# Patient Record
Sex: MALE | Race: OTHER | NOT HISPANIC OR LATINO | Employment: OTHER | URBAN - METROPOLITAN AREA
[De-identification: names, ages, dates, MRNs, and addresses within clinical notes are randomized per-mention and may not be internally consistent; named-entity substitution may affect disease eponyms.]

---

## 2022-07-22 ENCOUNTER — HOSPITAL ENCOUNTER (OUTPATIENT)
Dept: CARDIOLOGY | Facility: HOSPITAL | Age: 72
Discharge: HOME | End: 2022-07-22
Attending: INTERNAL MEDICINE
Payer: MEDICARE

## 2022-07-22 ENCOUNTER — OFFICE VISIT (OUTPATIENT)
Dept: CARDIOLOGY | Facility: CLINIC | Age: 72
End: 2022-07-22
Payer: MEDICARE

## 2022-07-22 VITALS
BODY MASS INDEX: 28.27 KG/M2 | DIASTOLIC BLOOD PRESSURE: 74 MMHG | HEART RATE: 64 BPM | SYSTOLIC BLOOD PRESSURE: 133 MMHG | WEIGHT: 220.25 LBS | HEIGHT: 74 IN

## 2022-07-22 DIAGNOSIS — I10 PRIMARY HYPERTENSION: ICD-10-CM

## 2022-07-22 DIAGNOSIS — I48.91 ATRIAL FIBRILLATION, UNSPECIFIED TYPE: ICD-10-CM

## 2022-07-22 DIAGNOSIS — E11.9 TYPE 2 DIABETES MELLITUS WITHOUT COMPLICATION, WITH LONG-TERM CURRENT USE OF INSULIN: ICD-10-CM

## 2022-07-22 DIAGNOSIS — E11.9 TYPE 2 DIABETES MELLITUS WITHOUT COMPLICATION, WITHOUT LONG-TERM CURRENT USE OF INSULIN: ICD-10-CM

## 2022-07-22 DIAGNOSIS — I48.92 PAROXYSMAL ATRIAL FLUTTER: ICD-10-CM

## 2022-07-22 DIAGNOSIS — Z79.4 TYPE 2 DIABETES MELLITUS WITHOUT COMPLICATION, WITH LONG-TERM CURRENT USE OF INSULIN: ICD-10-CM

## 2022-07-22 DIAGNOSIS — E78.49 OTHER HYPERLIPIDEMIA: ICD-10-CM

## 2022-07-22 DIAGNOSIS — I48.91 ATRIAL FIBRILLATION, UNSPECIFIED TYPE: Primary | ICD-10-CM

## 2022-07-22 DIAGNOSIS — I34.0 NONRHEUMATIC MITRAL VALVE REGURGITATION: Primary | ICD-10-CM

## 2022-07-22 DIAGNOSIS — I50.32 CHRONIC DIASTOLIC CONGESTIVE HEART FAILURE: ICD-10-CM

## 2022-07-22 PROCEDURE — 1126F AMNT PAIN NOTED NONE PRSNT: CPT | Mod: CPTII,S$GLB,, | Performed by: INTERNAL MEDICINE

## 2022-07-22 PROCEDURE — 1159F PR MEDICATION LIST DOCUMENTED IN MEDICAL RECORD: ICD-10-PCS | Mod: CPTII,S$GLB,, | Performed by: INTERNAL MEDICINE

## 2022-07-22 PROCEDURE — 4010F ACE/ARB THERAPY RXD/TAKEN: CPT | Mod: CPTII,S$GLB,, | Performed by: INTERNAL MEDICINE

## 2022-07-22 PROCEDURE — 99205 OFFICE O/P NEW HI 60 MIN: CPT | Mod: S$GLB,,, | Performed by: INTERNAL MEDICINE

## 2022-07-22 PROCEDURE — 99999 PR PBB SHADOW E&M-NEW PATIENT-LVL III: CPT | Mod: PBBFAC,,, | Performed by: INTERNAL MEDICINE

## 2022-07-22 PROCEDURE — 93005 ELECTROCARDIOGRAM TRACING: CPT

## 2022-07-22 PROCEDURE — 1160F RVW MEDS BY RX/DR IN RCRD: CPT | Mod: CPTII,S$GLB,, | Performed by: INTERNAL MEDICINE

## 2022-07-22 PROCEDURE — 3008F PR BODY MASS INDEX (BMI) DOCUMENTED: ICD-10-PCS | Mod: CPTII,S$GLB,, | Performed by: INTERNAL MEDICINE

## 2022-07-22 PROCEDURE — 1159F MED LIST DOCD IN RCRD: CPT | Mod: CPTII,S$GLB,, | Performed by: INTERNAL MEDICINE

## 2022-07-22 PROCEDURE — 3008F BODY MASS INDEX DOCD: CPT | Mod: CPTII,S$GLB,, | Performed by: INTERNAL MEDICINE

## 2022-07-22 PROCEDURE — 3078F PR MOST RECENT DIASTOLIC BLOOD PRESSURE < 80 MM HG: ICD-10-PCS | Mod: CPTII,S$GLB,, | Performed by: INTERNAL MEDICINE

## 2022-07-22 PROCEDURE — 93010 EKG 12-LEAD: ICD-10-PCS | Mod: ,,, | Performed by: INTERNAL MEDICINE

## 2022-07-22 PROCEDURE — 3078F DIAST BP <80 MM HG: CPT | Mod: CPTII,S$GLB,, | Performed by: INTERNAL MEDICINE

## 2022-07-22 PROCEDURE — 1160F PR REVIEW ALL MEDS BY PRESCRIBER/CLIN PHARMACIST DOCUMENTED: ICD-10-PCS | Mod: CPTII,S$GLB,, | Performed by: INTERNAL MEDICINE

## 2022-07-22 PROCEDURE — 93010 ELECTROCARDIOGRAM REPORT: CPT | Mod: ,,, | Performed by: INTERNAL MEDICINE

## 2022-07-22 PROCEDURE — 99999 PR PBB SHADOW E&M-NEW PATIENT-LVL III: ICD-10-PCS | Mod: PBBFAC,,, | Performed by: INTERNAL MEDICINE

## 2022-07-22 PROCEDURE — 4010F PR ACE/ARB THEARPY RXD/TAKEN: ICD-10-PCS | Mod: CPTII,S$GLB,, | Performed by: INTERNAL MEDICINE

## 2022-07-22 PROCEDURE — 1126F PR PAIN SEVERITY QUANTIFIED, NO PAIN PRESENT: ICD-10-PCS | Mod: CPTII,S$GLB,, | Performed by: INTERNAL MEDICINE

## 2022-07-22 PROCEDURE — 3075F PR MOST RECENT SYSTOLIC BLOOD PRESS GE 130-139MM HG: ICD-10-PCS | Mod: CPTII,S$GLB,, | Performed by: INTERNAL MEDICINE

## 2022-07-22 PROCEDURE — 3075F SYST BP GE 130 - 139MM HG: CPT | Mod: CPTII,S$GLB,, | Performed by: INTERNAL MEDICINE

## 2022-07-22 PROCEDURE — 99205 PR OFFICE/OUTPT VISIT, NEW, LEVL V, 60-74 MIN: ICD-10-PCS | Mod: S$GLB,,, | Performed by: INTERNAL MEDICINE

## 2022-07-22 RX ORDER — INSULIN GLARGINE 100 [IU]/ML
20 INJECTION, SOLUTION SUBCUTANEOUS
COMMUNITY
Start: 2021-11-30

## 2022-07-22 RX ORDER — BUPROPION HYDROCHLORIDE 150 MG/1
150 TABLET ORAL NIGHTLY
COMMUNITY
Start: 2022-01-02

## 2022-07-22 RX ORDER — METHOCARBAMOL 500 MG/1
1 TABLET, FILM COATED ORAL DAILY PRN
COMMUNITY
Start: 2022-07-03

## 2022-07-22 RX ORDER — DOXAZOSIN 8 MG/1
8 TABLET ORAL DAILY
COMMUNITY
Start: 2022-07-05

## 2022-07-22 RX ORDER — EMPAGLIFLOZIN 25 MG/1
25 TABLET, FILM COATED ORAL DAILY
COMMUNITY
Start: 2022-07-08

## 2022-07-22 RX ORDER — IRBESARTAN 300 MG/1
1 TABLET ORAL NIGHTLY
COMMUNITY
Start: 2022-05-30

## 2022-07-22 RX ORDER — APIXABAN 5 MG/1
5 TABLET, FILM COATED ORAL 2 TIMES DAILY
COMMUNITY
Start: 2022-07-15

## 2022-07-22 RX ORDER — DILTIAZEM HYDROCHLORIDE 240 MG/1
240 CAPSULE, COATED, EXTENDED RELEASE ORAL DAILY
COMMUNITY
Start: 2022-06-22 | End: 2022-08-23

## 2022-07-22 RX ORDER — PIOGLITAZONEHYDROCHLORIDE 15 MG/1
15 TABLET ORAL DAILY
COMMUNITY

## 2022-07-22 RX ORDER — AMLODIPINE BESYLATE 5 MG/1
5 TABLET ORAL DAILY
COMMUNITY
Start: 2022-06-23

## 2022-07-22 RX ORDER — MELOXICAM 7.5 MG/1
1 TABLET ORAL DAILY
COMMUNITY
Start: 2021-11-30

## 2022-07-22 RX ORDER — SPIRONOLACTONE 25 MG/1
25 TABLET ORAL DAILY
COMMUNITY
Start: 2022-02-18 | End: 2022-11-02

## 2022-07-22 RX ORDER — GLIPIZIDE 10 MG/1
1 TABLET ORAL 2 TIMES DAILY
COMMUNITY
Start: 2022-07-05

## 2022-07-22 RX ORDER — VITAMIN B COMPLEX
1 CAPSULE ORAL DAILY
COMMUNITY

## 2022-07-22 RX ORDER — METFORMIN HYDROCHLORIDE 1000 MG/1
1000 TABLET ORAL 2 TIMES DAILY
COMMUNITY
Start: 2022-06-22

## 2022-07-22 RX ORDER — HYDROCHLOROTHIAZIDE 25 MG/1
25 TABLET ORAL DAILY
COMMUNITY
Start: 2022-06-22

## 2022-07-22 RX ORDER — ASPIRIN 81 MG/1
81 TABLET ORAL
COMMUNITY

## 2022-07-22 RX ORDER — ATORVASTATIN CALCIUM 10 MG/1
10 TABLET, FILM COATED ORAL DAILY
COMMUNITY
Start: 2022-06-22

## 2022-07-22 NOTE — PROGRESS NOTES
Subjective:   Patient ID:  Chelle Villar is a 71 y.o. male who presents for evaluation of Valvular Heart Disease and Congestive Heart Failure      HPI   From DR ANDERSON   70-year-old  with a history of hypertension, heart failure with preserved ejection fraction/mid range heart failure, moderate mitral insufficiency diabetes mellitus, hyperlipidemia, paroxysmal atrial flutter presents for cardiac evaluation     The patient underwent stress echo EF was 45%. Echo and stress moderate to severe MR    Patient states he is asymptomatic he specific denies any chest pains or shortness of breath    Given his LV dysfunction I would like to consider moving forward with mitral valve surgery patient wishes a repeat of a 6 months and we will rediscuss then a cardiac echo be done prior to his follow-up    7/22/2022  HERE FRO SECOND OPINION  He was told he has a drop in ef and moderate mr. He walks for 1 mile he gets fatigued he plays some soccer low impact . He has no change in activity over the past 6 months. yhe was seen in march he had a lot of alcohol opn board. He drinks 6-7 beers twice weekly when he goes to [employer].   \lasta 1cf in 11/2021 5.9%   He is not sure why eh is labeled as allergic to ace inhibitors.has no nocturnal symptoms has no recent leg swelling he is using himalaya salt. He is not compliant with salt. He does not snore routinely. Has no orthop[leandro no pnd he is compliant with noac. He has lbbb on ekg has negative stress echo    there is a mention that his mr got worse with exercise   Was told he needs to entertain surgery. He is not on renal protection with arb.     Past Medical History:   Diagnosis Date    Chronic diastolic congestive heart failure 7/22/2022    Nonrheumatic mitral valve regurgitation 7/22/2022    Other hyperlipidemia 7/22/2022    Primary hypertension 7/22/2022    Type 2 diabetes mellitus without complication, without long-term current use of insulin 7/22/2022    Type 2  diabetes mellitus, with long-term current use of insulin 7/22/2022       Past Surgical History:   Procedure Laterality Date    INGUINAL HERNIA REPAIR         Social History     Tobacco Use    Smoking status: Former Smoker    Smokeless tobacco: Never Used   Substance Use Topics    Alcohol use: Yes     Alcohol/week: 7.0 standard drinks     Types: 7 Cans of beer per week     Comment: at least twice weekly        Family History   Problem Relation Age of Onset    Hypertension Mother        Current Outpatient Medications   Medication Sig    buPROPion (WELLBUTRIN XL) 150 MG TB24 tablet Take 150 mg by mouth every evening.    glipiZIDE (GLUCOTROL) 10 MG tablet Take 1 tablet by mouth 2 (two) times daily.    insulin (LANTUS SOLOSTAR U-100 INSULIN) glargine 100 units/mL SubQ pen Inject 20 Units into the skin.    irbesartan (AVAPRO) 300 MG tablet Take 1 tablet by mouth every evening.    meloxicam (MOBIC) 7.5 MG tablet Take 1 tablet by mouth once daily.    spironolactone (ALDACTONE) 25 MG tablet Take 25 mg by mouth once daily.    amLODIPine (NORVASC) 5 MG tablet Take 5 mg by mouth once daily.    aspirin (ECOTRIN) 81 MG EC tablet Take 81 mg by mouth.    atorvastatin (LIPITOR) 10 MG tablet Take 10 mg by mouth once daily.    b complex vitamins capsule Take 1 capsule by mouth once daily.    diltiaZEM (CARDIZEM CD) 240 MG 24 hr capsule Take 240 mg by mouth once daily.    doxazosin (CARDURA) 8 MG Tab Take 8 mg by mouth once daily.    ELIQUIS 5 mg Tab Take 5 mg by mouth 2 (two) times daily.    hydroCHLOROthiazide (HYDRODIURIL) 25 MG tablet Take 25 mg by mouth once daily.    JARDIANCE 25 mg tablet Take 25 mg by mouth once daily.    metFORMIN (GLUCOPHAGE) 1000 MG tablet Take 1,000 mg by mouth 2 (two) times daily.    methocarbamoL (ROBAXIN) 500 MG Tab Take 1 tablet by mouth daily as needed.    pioglitazone (ACTOS) 15 MG tablet Take 15 mg by mouth once daily.     No current facility-administered medications for this  visit.     Current Outpatient Medications on File Prior to Visit   Medication Sig    buPROPion (WELLBUTRIN XL) 150 MG TB24 tablet Take 150 mg by mouth every evening.    glipiZIDE (GLUCOTROL) 10 MG tablet Take 1 tablet by mouth 2 (two) times daily.    insulin (LANTUS SOLOSTAR U-100 INSULIN) glargine 100 units/mL SubQ pen Inject 20 Units into the skin.    irbesartan (AVAPRO) 300 MG tablet Take 1 tablet by mouth every evening.    meloxicam (MOBIC) 7.5 MG tablet Take 1 tablet by mouth once daily.    spironolactone (ALDACTONE) 25 MG tablet Take 25 mg by mouth once daily.    amLODIPine (NORVASC) 5 MG tablet Take 5 mg by mouth once daily.    aspirin (ECOTRIN) 81 MG EC tablet Take 81 mg by mouth.    atorvastatin (LIPITOR) 10 MG tablet Take 10 mg by mouth once daily.    b complex vitamins capsule Take 1 capsule by mouth once daily.    diltiaZEM (CARDIZEM CD) 240 MG 24 hr capsule Take 240 mg by mouth once daily.    doxazosin (CARDURA) 8 MG Tab Take 8 mg by mouth once daily.    ELIQUIS 5 mg Tab Take 5 mg by mouth 2 (two) times daily.    hydroCHLOROthiazide (HYDRODIURIL) 25 MG tablet Take 25 mg by mouth once daily.    JARDIANCE 25 mg tablet Take 25 mg by mouth once daily.    metFORMIN (GLUCOPHAGE) 1000 MG tablet Take 1,000 mg by mouth 2 (two) times daily.    methocarbamoL (ROBAXIN) 500 MG Tab Take 1 tablet by mouth daily as needed.    pioglitazone (ACTOS) 15 MG tablet Take 15 mg by mouth once daily.     No current facility-administered medications on file prior to visit.       Review of patient's allergies indicates:   Allergen Reactions    Ace inhibitors        Review of Systems   Constitutional: Positive for malaise/fatigue.   Eyes: Negative for blurred vision.   Cardiovascular: Negative for chest pain, claudication, cyanosis, dyspnea on exertion, irregular heartbeat, leg swelling, near-syncope, orthopnea, palpitations and paroxysmal nocturnal dyspnea.   Respiratory: Negative for cough, hemoptysis and  shortness of breath.    Hematologic/Lymphatic: Negative for bleeding problem. Does not bruise/bleed easily.   Skin: Negative for dry skin and itching.   Musculoskeletal: Negative for falls, muscle weakness and myalgias.   Gastrointestinal: Negative for abdominal pain, diarrhea, heartburn, hematemesis, hematochezia and melena.   Genitourinary: Negative for flank pain and hematuria.   Neurological: Negative for dizziness, focal weakness, headaches, light-headedness, numbness, paresthesias, seizures and weakness.   Psychiatric/Behavioral: Negative for altered mental status and memory loss. The patient is not nervous/anxious.    Allergic/Immunologic: Negative for hives.       Objective:   Physical Exam  Vitals and nursing note reviewed.   Constitutional:       General: He is not in acute distress.     Appearance: He is well-developed. He is not diaphoretic.   HENT:      Head: Normocephalic and atraumatic.   Eyes:      General:         Right eye: No discharge.         Left eye: No discharge.      Pupils: Pupils are equal, round, and reactive to light.   Neck:      Thyroid: No thyromegaly.      Vascular: No JVD.   Cardiovascular:      Rate and Rhythm: Normal rate and regular rhythm.      Pulses: Normal pulses and intact distal pulses.           Carotid pulses are 2+ on the right side and 2+ on the left side.       Radial pulses are 2+ on the right side and 2+ on the left side.        Femoral pulses are 2+ on the right side and 2+ on the left side.       Popliteal pulses are 2+ on the right side and 2+ on the left side.        Dorsalis pedis pulses are 2+ on the right side and 2+ on the left side.        Posterior tibial pulses are 2+ on the right side and 2+ on the left side.      Heart sounds: Murmur heard.   High-pitched blowing holosystolic murmur is present with a grade of 2/6 at the apex.    No friction rub. No gallop.   Pulmonary:      Effort: Pulmonary effort is normal. No respiratory distress.      Breath sounds:  "Normal breath sounds. No wheezing or rales.   Chest:      Chest wall: No tenderness.   Abdominal:      General: Bowel sounds are normal. There is no distension.      Palpations: Abdomen is soft.      Tenderness: There is no abdominal tenderness.   Musculoskeletal:         General: Normal range of motion.      Cervical back: Neck supple.      Right lower leg: No edema.      Left lower leg: No edema.   Skin:     General: Skin is warm and dry.      Findings: No erythema or rash.   Neurological:      Mental Status: He is alert and oriented to person, place, and time.      Cranial Nerves: No cranial nerve deficit.   Psychiatric:         Behavior: Behavior normal.         Judgment: Judgment normal.       Vitals:    07/22/22 0835   BP: 133/74   BP Location: Right arm   Patient Position: Sitting   BP Method: Small (Automatic)   Pulse: 64   Weight: 99.9 kg (220 lb 3.8 oz)   Height: 6' 2" (1.88 m)     No results found for: CHOL  No results found for: HDL  No results found for: LDLCALC  No results found for: TRIG  No results found for: CHOLHDL    Chemistry    No results found for: NA, K, CL, CO2, BUN, CREATININE, GLU No results found for: CALCIUM, ALKPHOS, AST, ALT, BILITOT, ESTGFRAFRICA, EGFRNONAA       No results found for: TSH  No results found for: INR, PROTIME  No results found for: WBC, HGB, HCT, MCV, PLT  BNP  @LABRCNTIP(BNP,BNPTRIAGEBLO)@  CrCl cannot be calculated (No successful lab value found.).     TREADMILL STRESS ECHO REPORT     DATE OF STUDY   3/24/2022     PATIENT   Chelle Villar   [id number]     PROCEDURES PERFORMED   Treadmill stress echocardiogram     INDICATION   Chest pain     PROCEDURE DETAILS   The procedure details were explained to the patient and/or guardian.     Informed consent was obtained. The patient underwent standard [place]   cardiovascular stress testing on the motorized treadmill.   Echocardiographic images were obtained in multiple views at baseline and   after maximum heart rate was achieved. "     Resting HR: 105 bpm    Target HR: 125 bpm   Resting BP: 104/62 mmHg     Max HR: 163 bpm % Target: 148%   Max Systolic: 142 mmHg   Max Diastolic: 88 mmHg   Exercise Duration: 06:00 min:sec    METS: 7.2 METS     The test was terminated due to the patient requesting to stop; ecg   changes; .     The patient reported no chest pain; .     FINDINGS   Baseline ECG: Normal sinus rhythm     Stress ECG: Sinus tachycardia, no ischemic changes     Baseline Echo: Normal wall motion. LVEF 40%     Stress Echo: All segments became appropriately hypercontractile with   physiologic stress. LVEF50%. No ischemic changes visualized.       Arrhythmias: No significant arrhythmias     Echo contrast was not utilized.     Images obtained were of suboptimal quality.     CONCLUSION   1. Moderate risk treadmill stress echocardiogram.   2. Normal blood pressure response.   3. Average exercise tolerance for age and gender.       [facility]  -----------------------    CONCLUSIONS: 11/2021  1. Mildly dilated left ventricle. Mildly depressed left ventricular systolic function.   LVEF 45 - 50%.   2. Mildly dilated right ventricle.   3. Mild to moderate mitral valve regurgitation.   4. Small pericardial effusion. is localized around the right atrium.     Assessment:     1. Nonrheumatic mitral valve regurgitation    2. Primary hypertension    3. Type 2 diabetes mellitus without complication, without long-term current use of insulin    4. Other hyperlipidemia    5. Chronic diastolic congestive heart failure    6. Paroxysmal atrial flutter    7. Type 2 diabetes mellitus without complication, with long-term current use of insulin      MR with good exercise tolerance no change lately at least 18 months by history will get stress echo images to review.  htn needs arb will add his ace inhibitor allergy either not true or vague he is not aware of it.low salt diet emphasized I think he intake is significant.  Diabetes good control  continue same counseled about compliance.   Longstanding etoh use affecting his lv dysfunction he is on the ehavy side may be a component of alcoholic cardiomyopathy  hlp on statins last ldl on target.continue same  PAF ON ELIQUIS NO RECURRENCE NO SIDE EFFECTS CONTINUE SAME    HE WI;L BENEFIT FROM B BLOCKERS SPECIALLY WITH ETOH ON BOARD.    NOT SURE IF SLEEP APNEA IS AN ISSUE  EKG SHOWS LBBB NO MENTION ON EKG FROM THE [facility] MENTION IVCD WILL REVIEW.  Plan:   Losartan 25 mg po daily AFTER REVIEW ECHO AND STRESS AND EKG    low salt diet   Consider switching ca blockers cardizem to coreg  Continue eliquis   repeat echo   Obtain stress echo to review images and lv cavity with exercise to see if any surgery is needed   DECREASE ALCOHOL INTAKE   F/U IN 1 MONTH

## 2022-08-22 ENCOUNTER — HOSPITAL ENCOUNTER (OUTPATIENT)
Dept: CARDIOLOGY | Facility: HOSPITAL | Age: 72
Discharge: HOME | End: 2022-08-22
Attending: INTERNAL MEDICINE
Payer: MEDICARE

## 2022-08-22 VITALS
WEIGHT: 220 LBS | BODY MASS INDEX: 28.23 KG/M2 | DIASTOLIC BLOOD PRESSURE: 74 MMHG | HEIGHT: 74 IN | SYSTOLIC BLOOD PRESSURE: 133 MMHG

## 2022-08-22 DIAGNOSIS — I34.0 NONRHEUMATIC MITRAL VALVE REGURGITATION: ICD-10-CM

## 2022-08-22 DIAGNOSIS — I50.32 CHRONIC DIASTOLIC CONGESTIVE HEART FAILURE: ICD-10-CM

## 2022-08-22 LAB
AORTIC ROOT ANNULUS: 3.2 CM
ASCENDING AORTA: 2.87 CM
AV INDEX (PROSTH): 0.75
AV MEAN GRADIENT: 3 MMHG
AV PEAK GRADIENT: 6 MMHG
AV VALVE AREA: 2.56 CM2
AV VELOCITY RATIO: 0.74
BSA FOR ECHO PROCEDURE: 2.28 M2
CV ECHO LV RWT: 0.41 CM
DOP CALC AO PEAK VEL: 1.2 M/S
DOP CALC AO VTI: 29.3 CM
DOP CALC LVOT AREA: 3.4 CM2
DOP CALC LVOT DIAMETER: 2.08 CM
DOP CALC LVOT PEAK VEL: 0.89 M/S
DOP CALC LVOT STROKE VOLUME: 75.06 CM3
DOP CALC RVOT PEAK VEL: 0.72 M/S
DOP CALC RVOT VTI: 18.3 CM
DOP CALCLVOT PEAK VEL VTI: 22.1 CM
E WAVE DECELERATION TIME: 206.72 MSEC
E/A RATIO: 1.96
E/E' RATIO: 13.43 M/S
ECHO LV POSTERIOR WALL: 1.19 CM (ref 0.6–1.1)
EJECTION FRACTION: 45 %
FRACTIONAL SHORTENING: 23 % (ref 28–44)
INTERVENTRICULAR SEPTUM: 1.33 CM (ref 0.6–1.1)
IVC DIAMETER: 1.4 CM
IVRT: 85.63 MSEC
LA MAJOR: 5.68 CM
LA MINOR: 5.7 CM
LA WIDTH: 4.8 CM
LEFT ATRIUM SIZE: 4.78 CM
LEFT ATRIUM VOLUME INDEX MOD: 41.1 ML/M2
LEFT ATRIUM VOLUME INDEX: 49.1 ML/M2
LEFT ATRIUM VOLUME MOD: 92.97 CM3
LEFT ATRIUM VOLUME: 110.97 CM3
LEFT INTERNAL DIMENSION IN SYSTOLE: 4.41 CM (ref 2.1–4)
LEFT VENTRICLE DIASTOLIC VOLUME INDEX: 72.68 ML/M2
LEFT VENTRICLE DIASTOLIC VOLUME: 164.25 ML
LEFT VENTRICLE MASS INDEX: 139 G/M2
LEFT VENTRICLE SYSTOLIC VOLUME INDEX: 39.1 ML/M2
LEFT VENTRICLE SYSTOLIC VOLUME: 88.28 ML
LEFT VENTRICULAR INTERNAL DIMENSION IN DIASTOLE: 5.76 CM (ref 3.5–6)
LEFT VENTRICULAR MASS: 313.9 G
LV LATERAL E/E' RATIO: 11.75 M/S
LV SEPTAL E/E' RATIO: 15.67 M/S
LVOT MG: 1.81 MMHG
LVOT MV: 0.63 CM/S
MV PEAK A VEL: 0.48 M/S
MV PEAK E VEL: 0.94 M/S
MV STENOSIS PRESSURE HALF TIME: 59.95 MS
MV VALVE AREA P 1/2 METHOD: 3.67 CM2
PISA TR MAX VEL: 2.82 M/S
PULM VEIN S/D RATIO: 0.54
PV MEAN GRADIENT: 1.14 MMHG
PV PEAK D VEL: 0.8 M/S
PV PEAK S VEL: 0.43 M/S
PV PEAK VELOCITY: 0.9 CM/S
RA MAJOR: 5.16 CM
RA PRESSURE: 8 MMHG
RA WIDTH: 3.9 CM
RIGHT VENTRICULAR END-DIASTOLIC DIMENSION: 3.41 CM
SINUS: 3.29 CM
STJ: 2.69 CM
TDI LATERAL: 0.08 M/S
TDI SEPTAL: 0.06 M/S
TDI: 0.07 M/S
TR MAX PG: 32 MMHG
TRICUSPID ANNULAR PLANE SYSTOLIC EXCURSION: 2.26 CM
TV REST PULMONARY ARTERY PRESSURE: 40 MMHG

## 2022-08-22 PROCEDURE — 93306 TTE W/DOPPLER COMPLETE: CPT | Mod: 26,,, | Performed by: INTERNAL MEDICINE

## 2022-08-22 PROCEDURE — 93306 TTE W/DOPPLER COMPLETE: CPT

## 2022-08-22 PROCEDURE — 93306 ECHO (CUPID ONLY): ICD-10-PCS | Mod: 26,,, | Performed by: INTERNAL MEDICINE

## 2022-08-23 ENCOUNTER — OFFICE VISIT (OUTPATIENT)
Dept: CARDIOLOGY | Facility: CLINIC | Age: 72
End: 2022-08-23
Payer: MEDICARE

## 2022-08-23 VITALS
HEART RATE: 68 BPM | DIASTOLIC BLOOD PRESSURE: 64 MMHG | HEIGHT: 74 IN | OXYGEN SATURATION: 98 % | WEIGHT: 221.13 LBS | BODY MASS INDEX: 28.38 KG/M2 | SYSTOLIC BLOOD PRESSURE: 128 MMHG

## 2022-08-23 DIAGNOSIS — E78.49 OTHER HYPERLIPIDEMIA: ICD-10-CM

## 2022-08-23 DIAGNOSIS — I50.32 CHRONIC DIASTOLIC CONGESTIVE HEART FAILURE: Primary | ICD-10-CM

## 2022-08-23 DIAGNOSIS — I34.0 NONRHEUMATIC MITRAL VALVE REGURGITATION: ICD-10-CM

## 2022-08-23 DIAGNOSIS — I10 PRIMARY HYPERTENSION: ICD-10-CM

## 2022-08-23 DIAGNOSIS — I48.92 PAROXYSMAL ATRIAL FLUTTER: ICD-10-CM

## 2022-08-23 PROCEDURE — 1159F PR MEDICATION LIST DOCUMENTED IN MEDICAL RECORD: ICD-10-PCS | Mod: CPTII,S$GLB,, | Performed by: INTERNAL MEDICINE

## 2022-08-23 PROCEDURE — 4010F PR ACE/ARB THEARPY RXD/TAKEN: ICD-10-PCS | Mod: CPTII,S$GLB,, | Performed by: INTERNAL MEDICINE

## 2022-08-23 PROCEDURE — 3288F FALL RISK ASSESSMENT DOCD: CPT | Mod: CPTII,S$GLB,, | Performed by: INTERNAL MEDICINE

## 2022-08-23 PROCEDURE — 3288F PR FALLS RISK ASSESSMENT DOCUMENTED: ICD-10-PCS | Mod: CPTII,S$GLB,, | Performed by: INTERNAL MEDICINE

## 2022-08-23 PROCEDURE — 99999 PR PBB SHADOW E&M-EST. PATIENT-LVL IV: ICD-10-PCS | Mod: PBBFAC,,, | Performed by: INTERNAL MEDICINE

## 2022-08-23 PROCEDURE — 1160F PR REVIEW ALL MEDS BY PRESCRIBER/CLIN PHARMACIST DOCUMENTED: ICD-10-PCS | Mod: CPTII,S$GLB,, | Performed by: INTERNAL MEDICINE

## 2022-08-23 PROCEDURE — 1159F MED LIST DOCD IN RCRD: CPT | Mod: CPTII,S$GLB,, | Performed by: INTERNAL MEDICINE

## 2022-08-23 PROCEDURE — 3078F DIAST BP <80 MM HG: CPT | Mod: CPTII,S$GLB,, | Performed by: INTERNAL MEDICINE

## 2022-08-23 PROCEDURE — 3008F BODY MASS INDEX DOCD: CPT | Mod: CPTII,S$GLB,, | Performed by: INTERNAL MEDICINE

## 2022-08-23 PROCEDURE — 99214 OFFICE O/P EST MOD 30 MIN: CPT | Mod: S$GLB,,, | Performed by: INTERNAL MEDICINE

## 2022-08-23 PROCEDURE — 3008F PR BODY MASS INDEX (BMI) DOCUMENTED: ICD-10-PCS | Mod: CPTII,S$GLB,, | Performed by: INTERNAL MEDICINE

## 2022-08-23 PROCEDURE — 3074F PR MOST RECENT SYSTOLIC BLOOD PRESSURE < 130 MM HG: ICD-10-PCS | Mod: CPTII,S$GLB,, | Performed by: INTERNAL MEDICINE

## 2022-08-23 PROCEDURE — 1126F AMNT PAIN NOTED NONE PRSNT: CPT | Mod: CPTII,S$GLB,, | Performed by: INTERNAL MEDICINE

## 2022-08-23 PROCEDURE — 1101F PT FALLS ASSESS-DOCD LE1/YR: CPT | Mod: CPTII,S$GLB,, | Performed by: INTERNAL MEDICINE

## 2022-08-23 PROCEDURE — 1160F RVW MEDS BY RX/DR IN RCRD: CPT | Mod: CPTII,S$GLB,, | Performed by: INTERNAL MEDICINE

## 2022-08-23 PROCEDURE — 3074F SYST BP LT 130 MM HG: CPT | Mod: CPTII,S$GLB,, | Performed by: INTERNAL MEDICINE

## 2022-08-23 PROCEDURE — 3078F PR MOST RECENT DIASTOLIC BLOOD PRESSURE < 80 MM HG: ICD-10-PCS | Mod: CPTII,S$GLB,, | Performed by: INTERNAL MEDICINE

## 2022-08-23 PROCEDURE — 4010F ACE/ARB THERAPY RXD/TAKEN: CPT | Mod: CPTII,S$GLB,, | Performed by: INTERNAL MEDICINE

## 2022-08-23 PROCEDURE — 99214 PR OFFICE/OUTPT VISIT, EST, LEVL IV, 30-39 MIN: ICD-10-PCS | Mod: S$GLB,,, | Performed by: INTERNAL MEDICINE

## 2022-08-23 PROCEDURE — 1101F PR PT FALLS ASSESS DOC 0-1 FALLS W/OUT INJ PAST YR: ICD-10-PCS | Mod: CPTII,S$GLB,, | Performed by: INTERNAL MEDICINE

## 2022-08-23 PROCEDURE — 99999 PR PBB SHADOW E&M-EST. PATIENT-LVL IV: CPT | Mod: PBBFAC,,, | Performed by: INTERNAL MEDICINE

## 2022-08-23 PROCEDURE — 1126F PR PAIN SEVERITY QUANTIFIED, NO PAIN PRESENT: ICD-10-PCS | Mod: CPTII,S$GLB,, | Performed by: INTERNAL MEDICINE

## 2022-08-23 RX ORDER — METOPROLOL SUCCINATE 50 MG/1
50 TABLET, EXTENDED RELEASE ORAL DAILY
Qty: 30 TABLET | Refills: 6 | Status: SHIPPED | OUTPATIENT
Start: 2022-08-23 | End: 2023-02-23

## 2022-08-23 NOTE — PROGRESS NOTES
Subjective:   Patient ID:  Chelle Villar is a 71 y.o. male who presents for follow up of No chief complaint on file.      HPI  From DR ANDERSON   70-year-old  with a history of hypertension, heart failure with preserved ejection fraction/mid range heart failure, moderate mitral insufficiency diabetes mellitus, hyperlipidemia, paroxysmal atrial flutter presents for cardiac evaluation     The patient underwent stress echo EF was 45%. Echo and stress moderate to severe MR    Patient states he is asymptomatic he specific denies any chest pains or shortness of breath    Given his LV dysfunction I would like to consider moving forward with mitral valve surgery patient wishes a repeat of a 6 months and we will rediscuss then a cardiac echo be done prior to his follow-up     7/22/2022  HERE FRO SECOND OPINION  He was told he has a drop in ef and moderate mr. He walks for 1 mile he gets fatigued he plays some soccer low impact . He has no change in activity over the past 6 months. yhe was seen in march he had a lot of alcohol opn board. He drinks 6-7 beers twice weekly when he goes to [employer].   \lasta 1cf in 11/2021 5.9%   He is not sure why eh is labeled as allergic to ace inhibitors.has no nocturnal symptoms has no recent leg swelling he is using himalaya salt. He is not compliant with salt. He does not snore routinely. Has no orthop[leandro no pnd he is compliant with noac. He has lbbb on ekg has negative stress echo    there is a mention that his mr got worse with exercise   Was told he needs to entertain surgery. He is not on renal protection with arb.     8/23/2022  Here fo f/u has decreased alcohol significantly since last visit . He is more compliant with slat. I have reviewed his MEds he is on ARB. HE IS ON DIURETICS EF 45% MODERATE MR LV CAVITY OK. MAYA SNO CHF SYMPTOMS. HAS NO EXERTIONAL LIMITATION. HE IS ON 2 CA BLOCKERS.   Past Medical History:   Diagnosis Date    Chronic diastolic congestive heart failure  7/22/2022    Nonrheumatic mitral valve regurgitation 7/22/2022    Other hyperlipidemia 7/22/2022    Primary hypertension 7/22/2022    Type 2 diabetes mellitus without complication, without long-term current use of insulin 7/22/2022    Type 2 diabetes mellitus, with long-term current use of insulin 7/22/2022       Past Surgical History:   Procedure Laterality Date    INGUINAL HERNIA REPAIR         Social History     Tobacco Use    Smoking status: Former Smoker    Smokeless tobacco: Never Used   Substance Use Topics    Alcohol use: Yes     Alcohol/week: 7.0 standard drinks     Types: 7 Cans of beer per week     Comment: at least twice weekly        Family History   Problem Relation Age of Onset    Hypertension Mother        Current Outpatient Medications   Medication Sig    amLODIPine (NORVASC) 5 MG tablet Take 5 mg by mouth once daily.    aspirin (ECOTRIN) 81 MG EC tablet Take 81 mg by mouth.    atorvastatin (LIPITOR) 10 MG tablet Take 10 mg by mouth once daily.    b complex vitamins capsule Take 1 capsule by mouth once daily.    buPROPion (WELLBUTRIN XL) 150 MG TB24 tablet Take 150 mg by mouth every evening.    diltiaZEM (CARDIZEM CD) 240 MG 24 hr capsule Take 240 mg by mouth once daily.    doxazosin (CARDURA) 8 MG Tab Take 8 mg by mouth once daily.    ELIQUIS 5 mg Tab Take 5 mg by mouth 2 (two) times daily.    glipiZIDE (GLUCOTROL) 10 MG tablet Take 1 tablet by mouth 2 (two) times daily.    hydroCHLOROthiazide (HYDRODIURIL) 25 MG tablet Take 25 mg by mouth once daily.    insulin glargine 100 units/mL SubQ pen Inject 20 Units into the skin.    irbesartan (AVAPRO) 300 MG tablet Take 1 tablet by mouth every evening.    JARDIANCE 25 mg tablet Take 25 mg by mouth once daily.    meloxicam (MOBIC) 7.5 MG tablet Take 1 tablet by mouth once daily.    metFORMIN (GLUCOPHAGE) 1000 MG tablet Take 1,000 mg by mouth 2 (two) times daily.    methocarbamoL (ROBAXIN) 500 MG Tab Take 1 tablet by mouth daily  as needed.    pioglitazone (ACTOS) 15 MG tablet Take 15 mg by mouth once daily.    spironolactone (ALDACTONE) 25 MG tablet Take 25 mg by mouth once daily.     No current facility-administered medications for this visit.     Current Outpatient Medications on File Prior to Visit   Medication Sig    amLODIPine (NORVASC) 5 MG tablet Take 5 mg by mouth once daily.    aspirin (ECOTRIN) 81 MG EC tablet Take 81 mg by mouth.    atorvastatin (LIPITOR) 10 MG tablet Take 10 mg by mouth once daily.    b complex vitamins capsule Take 1 capsule by mouth once daily.    buPROPion (WELLBUTRIN XL) 150 MG TB24 tablet Take 150 mg by mouth every evening.    diltiaZEM (CARDIZEM CD) 240 MG 24 hr capsule Take 240 mg by mouth once daily.    doxazosin (CARDURA) 8 MG Tab Take 8 mg by mouth once daily.    ELIQUIS 5 mg Tab Take 5 mg by mouth 2 (two) times daily.    glipiZIDE (GLUCOTROL) 10 MG tablet Take 1 tablet by mouth 2 (two) times daily.    hydroCHLOROthiazide (HYDRODIURIL) 25 MG tablet Take 25 mg by mouth once daily.    insulin glargine 100 units/mL SubQ pen Inject 20 Units into the skin.    irbesartan (AVAPRO) 300 MG tablet Take 1 tablet by mouth every evening.    JARDIANCE 25 mg tablet Take 25 mg by mouth once daily.    meloxicam (MOBIC) 7.5 MG tablet Take 1 tablet by mouth once daily.    metFORMIN (GLUCOPHAGE) 1000 MG tablet Take 1,000 mg by mouth 2 (two) times daily.    methocarbamoL (ROBAXIN) 500 MG Tab Take 1 tablet by mouth daily as needed.    pioglitazone (ACTOS) 15 MG tablet Take 15 mg by mouth once daily.    spironolactone (ALDACTONE) 25 MG tablet Take 25 mg by mouth once daily.     No current facility-administered medications on file prior to visit.     Lab Results   Component Value Date    HGBA1C 5.9 11/18/2021     Review of Systems   Constitutional: Negative for malaise/fatigue.   Eyes: Negative for blurred vision.   Cardiovascular: Negative for chest pain, claudication, cyanosis, dyspnea on exertion,  "irregular heartbeat, leg swelling, near-syncope, orthopnea, palpitations and paroxysmal nocturnal dyspnea.   Respiratory: Negative for cough, hemoptysis and shortness of breath.    Hematologic/Lymphatic: Negative for bleeding problem. Does not bruise/bleed easily.   Skin: Negative for dry skin and itching.   Musculoskeletal: Negative for falls, muscle weakness and myalgias.   Gastrointestinal: Negative for abdominal pain, diarrhea, heartburn, hematemesis, hematochezia and melena.   Genitourinary: Negative for flank pain and hematuria.   Neurological: Negative for dizziness, focal weakness, headaches, light-headedness, numbness, paresthesias, seizures and weakness.   Psychiatric/Behavioral: Negative for altered mental status and memory loss. The patient is not nervous/anxious.    Allergic/Immunologic: Negative for hives.       Objective:   Physical Exam  Vitals:    08/23/22 1344 08/23/22 1348   BP: 122/60 128/64   BP Location: Right arm Left arm   Patient Position: Sitting Sitting   BP Method: Medium (Manual) Medium (Manual)   Pulse: 68    SpO2: 98%    Weight: 100.3 kg (221 lb 1.9 oz)    Height: 6' 2" (1.88 m)      No results found for: CHOL  No results found for: HDL  No results found for: LDLCALC  No results found for: TRIG  No results found for: CHOLHDL    Chemistry    No results found for: NA, K, CL, CO2, BUN, CREATININE, GLU No results found for: CALCIUM, ALKPHOS, AST, ALT, BILITOT, ESTGFRAFRICA, EGFRNONAA       No results found for: TSH  No results found for: INR, PROTIME  No results found for: WBC, HGB, HCT, MCV, PLT  BMP  No results found for: NA, K, CL, CO2, BUN, CREATININE, CALCIUM, ANIONGAP, ESTGFRAFRICA, EGFRNONAA  CrCl cannot be calculated (No successful lab value found.).  Summary    · The left ventricle is normal in size with eccentric hypertrophy and mildly decreased systolic function.  · Severe left atrial enlargement.  · The estimated ejection fraction is 45%.  · Grade II left ventricular diastolic " dysfunction.  · There are segmental left ventricular wall motion abnormalities.  · Normal right ventricular size with normal right ventricular systolic function.  · Mild right atrial enlargement.  · Moderate mitral regurgitation.  · Moderate tricuspid regurgitation.  · Mild pulmonic regurgitation.  · Intermediate central venous pressure (8 mmHg).  · The estimated PA systolic pressure is 40 mmHg.      Assessment:     1. Chronic diastolic congestive heart failure    2. Nonrheumatic mitral valve regurgitation    3. Other hyperlipidemia    4. Paroxysmal atrial flutter    5. Primary hypertension      DISCUSSED ECHO FINDINGS REVBIEWED IMAGES MUYSELF HE IS AON arb he is on 2 ca blockers he will be better off [id number] and on b [id number] will use toprol xl 50 mg po daily. Discussed effects of alcohol on myocardium as well as htn control slat intake and compliance.   His mr is moderate he has no functional limitation will continue same ]   reviewed stress e4cho from [place] no lv dilatation with exercise. No ischemia.   Plan:   As per above    dc cardizem    switch to toprol xl 50 mg po daily continue eliquis    f/u in 6 weeks with holter   echo in 6 month to assess lv consider repeat stress to assess lv dilatation.

## 2022-09-09 ENCOUNTER — HOSPITAL ENCOUNTER (OUTPATIENT)
Dept: CARDIOLOGY | Facility: HOSPITAL | Age: 72
Discharge: HOME | End: 2022-09-09
Attending: INTERNAL MEDICINE
Payer: MEDICARE

## 2022-09-09 DIAGNOSIS — I48.92 PAROXYSMAL ATRIAL FLUTTER: ICD-10-CM

## 2022-09-09 PROCEDURE — 93227 HOLTER MONITOR - 48 HOUR (CUPID ONLY): ICD-10-PCS | Mod: ,,, | Performed by: INTERNAL MEDICINE

## 2022-09-09 PROCEDURE — 93226 XTRNL ECG REC<48 HR SCAN A/R: CPT

## 2022-09-09 PROCEDURE — 93227 XTRNL ECG REC<48 HR R&I: CPT | Mod: ,,, | Performed by: INTERNAL MEDICINE

## 2022-09-12 LAB
OHS CV EVENT MONITOR DAY: 0
OHS CV HOLTER LENGTH DECIMAL HOURS: 48
OHS CV HOLTER LENGTH HOURS: 48
OHS CV HOLTER LENGTH MINUTES: 0
OHS CV HOLTER SINUS AVERAGE HR: 64
OHS CV HOLTER SINUS MAX HR: 117
OHS CV HOLTER SINUS MIN HR: 47

## 2022-09-14 ENCOUNTER — TELEPHONE (OUTPATIENT)
Dept: CARDIOLOGY | Facility: CLINIC | Age: 72
End: 2022-09-14
Payer: MEDICARE

## 2022-09-14 NOTE — TELEPHONE ENCOUNTER
Patient was notified of results. All questions were answered. Pt verbalized understanding. Pt will call back with any other questions or concerns.      ----- Message from Aimee Benitez MD sent at 9/14/2022  7:52 AM CDT -----  HAS FEW SKIPPED BEATS NO FAST EPISODES . CONTINUE SAME MEDS NEEDS TO AVOID ETOH

## 2022-11-02 ENCOUNTER — OFFICE VISIT (OUTPATIENT)
Dept: CARDIOLOGY | Facility: CLINIC | Age: 72
End: 2022-11-02
Payer: MEDICARE

## 2022-11-02 VITALS
DIASTOLIC BLOOD PRESSURE: 66 MMHG | WEIGHT: 217.63 LBS | BODY MASS INDEX: 27.93 KG/M2 | SYSTOLIC BLOOD PRESSURE: 136 MMHG | HEIGHT: 74 IN | OXYGEN SATURATION: 98 % | HEART RATE: 71 BPM

## 2022-11-02 DIAGNOSIS — I34.0 NONRHEUMATIC MITRAL VALVE REGURGITATION: Primary | ICD-10-CM

## 2022-11-02 DIAGNOSIS — E78.49 OTHER HYPERLIPIDEMIA: ICD-10-CM

## 2022-11-02 DIAGNOSIS — I10 PRIMARY HYPERTENSION: ICD-10-CM

## 2022-11-02 DIAGNOSIS — I50.32 CHRONIC DIASTOLIC CONGESTIVE HEART FAILURE: ICD-10-CM

## 2022-11-02 DIAGNOSIS — Z79.4 TYPE 2 DIABETES MELLITUS WITHOUT COMPLICATION, WITH LONG-TERM CURRENT USE OF INSULIN: ICD-10-CM

## 2022-11-02 DIAGNOSIS — E11.9 TYPE 2 DIABETES MELLITUS WITHOUT COMPLICATION, WITH LONG-TERM CURRENT USE OF INSULIN: ICD-10-CM

## 2022-11-02 DIAGNOSIS — I48.92 PAROXYSMAL ATRIAL FLUTTER: ICD-10-CM

## 2022-11-02 PROCEDURE — 3078F DIAST BP <80 MM HG: CPT | Mod: CPTII,S$GLB,, | Performed by: INTERNAL MEDICINE

## 2022-11-02 PROCEDURE — 99999 PR PBB SHADOW E&M-EST. PATIENT-LVL IV: CPT | Mod: PBBFAC,,, | Performed by: INTERNAL MEDICINE

## 2022-11-02 PROCEDURE — 3075F PR MOST RECENT SYSTOLIC BLOOD PRESS GE 130-139MM HG: ICD-10-PCS | Mod: CPTII,S$GLB,, | Performed by: INTERNAL MEDICINE

## 2022-11-02 PROCEDURE — 1101F PR PT FALLS ASSESS DOC 0-1 FALLS W/OUT INJ PAST YR: ICD-10-PCS | Mod: CPTII,S$GLB,, | Performed by: INTERNAL MEDICINE

## 2022-11-02 PROCEDURE — 1101F PT FALLS ASSESS-DOCD LE1/YR: CPT | Mod: CPTII,S$GLB,, | Performed by: INTERNAL MEDICINE

## 2022-11-02 PROCEDURE — 3288F PR FALLS RISK ASSESSMENT DOCUMENTED: ICD-10-PCS | Mod: CPTII,S$GLB,, | Performed by: INTERNAL MEDICINE

## 2022-11-02 PROCEDURE — 4010F PR ACE/ARB THEARPY RXD/TAKEN: ICD-10-PCS | Mod: CPTII,S$GLB,, | Performed by: INTERNAL MEDICINE

## 2022-11-02 PROCEDURE — 1159F PR MEDICATION LIST DOCUMENTED IN MEDICAL RECORD: ICD-10-PCS | Mod: CPTII,S$GLB,, | Performed by: INTERNAL MEDICINE

## 2022-11-02 PROCEDURE — 4010F ACE/ARB THERAPY RXD/TAKEN: CPT | Mod: CPTII,S$GLB,, | Performed by: INTERNAL MEDICINE

## 2022-11-02 PROCEDURE — 1160F PR REVIEW ALL MEDS BY PRESCRIBER/CLIN PHARMACIST DOCUMENTED: ICD-10-PCS | Mod: CPTII,S$GLB,, | Performed by: INTERNAL MEDICINE

## 2022-11-02 PROCEDURE — 1126F PR PAIN SEVERITY QUANTIFIED, NO PAIN PRESENT: ICD-10-PCS | Mod: CPTII,S$GLB,, | Performed by: INTERNAL MEDICINE

## 2022-11-02 PROCEDURE — 3078F PR MOST RECENT DIASTOLIC BLOOD PRESSURE < 80 MM HG: ICD-10-PCS | Mod: CPTII,S$GLB,, | Performed by: INTERNAL MEDICINE

## 2022-11-02 PROCEDURE — 3008F BODY MASS INDEX DOCD: CPT | Mod: CPTII,S$GLB,, | Performed by: INTERNAL MEDICINE

## 2022-11-02 PROCEDURE — 1126F AMNT PAIN NOTED NONE PRSNT: CPT | Mod: CPTII,S$GLB,, | Performed by: INTERNAL MEDICINE

## 2022-11-02 PROCEDURE — 3288F FALL RISK ASSESSMENT DOCD: CPT | Mod: CPTII,S$GLB,, | Performed by: INTERNAL MEDICINE

## 2022-11-02 PROCEDURE — 3008F PR BODY MASS INDEX (BMI) DOCUMENTED: ICD-10-PCS | Mod: CPTII,S$GLB,, | Performed by: INTERNAL MEDICINE

## 2022-11-02 PROCEDURE — 99999 PR PBB SHADOW E&M-EST. PATIENT-LVL IV: ICD-10-PCS | Mod: PBBFAC,,, | Performed by: INTERNAL MEDICINE

## 2022-11-02 PROCEDURE — 1160F RVW MEDS BY RX/DR IN RCRD: CPT | Mod: CPTII,S$GLB,, | Performed by: INTERNAL MEDICINE

## 2022-11-02 PROCEDURE — 3075F SYST BP GE 130 - 139MM HG: CPT | Mod: CPTII,S$GLB,, | Performed by: INTERNAL MEDICINE

## 2022-11-02 PROCEDURE — 99214 PR OFFICE/OUTPT VISIT, EST, LEVL IV, 30-39 MIN: ICD-10-PCS | Mod: S$GLB,,, | Performed by: INTERNAL MEDICINE

## 2022-11-02 PROCEDURE — 1159F MED LIST DOCD IN RCRD: CPT | Mod: CPTII,S$GLB,, | Performed by: INTERNAL MEDICINE

## 2022-11-02 PROCEDURE — 99214 OFFICE O/P EST MOD 30 MIN: CPT | Mod: S$GLB,,, | Performed by: INTERNAL MEDICINE

## 2022-11-02 NOTE — PROGRESS NOTES
Subjective:   Patient ID:  Chelle Villar is a 72 y.o. male who presents for follow up of No chief complaint on file.      HPI  From DR ANDERSON   70-year-old  with a history of hypertension, heart failure with preserved ejection fraction/mid range heart failure, moderate mitral insufficiency diabetes mellitus, hyperlipidemia, paroxysmal atrial flutter presents for cardiac evaluation     The patient underwent stress echo EF was 45%. Echo and stress moderate to severe MR    Patient states he is asymptomatic he specific denies any chest pains or shortness of breath    Given his LV dysfunction I would like to consider moving forward with mitral valve surgery patient wishes a repeat of a 6 months and we will rediscuss then a cardiac echo be done prior to his follow-up     7/22/2022  HERE FRO SECOND OPINION  He was told he has a drop in ef and moderate mr. He walks for 1 mile he gets fatigued he plays some soccer low impact . He has no change in activity over the past 6 months. yhe was seen in march he had a lot of alcohol opn board. He drinks 6-7 beers twice weekly when he goes to [employer].   \lasta 1cf in 11/2021 5.9%   He is not sure why eh is labeled as allergic to ace inhibitors.has no nocturnal symptoms has no recent leg swelling he is using himalaya salt. He is not compliant with salt. He does not snore routinely. Has no orthop[leandro no pnd he is compliant with noac. He has lbbb on ekg has negative stress echo    there is a mention that his mr got worse with exercise   Was told he needs to entertain surgery. He is not on renal protection with arb.      8/23/2022  Here fo f/u has decreased alcohol significantly since last visit . He is more compliant with slat. I have reviewed his MEds he is on ARB. HE IS ON DIURETICS EF 45% MODERATE MR LV CAVITY OK. MAYA SNO CHF SYMPTOMS. HAS NO EXERTIONAL LIMITATION. HE IS ON 2 CA BLOCKERS.     11/2/2022   Plays a little soccer walks twice a week. He has noticed he is sleepy.  He HAs noticed a decrease in exercise tolerance.    He had an echo showing moderate mr here . Saw DR ANDERSON BACK AGAIN   He has no new symptoms otherwise. He is compliant with meds. He had a repeat echo I do not see it in careeverywhere. He ahs no signs of chf. He is complaint with salt intake etoh no signs of any cardic decompemnsation.    Past Medical History:   Diagnosis Date    Chronic diastolic congestive heart failure 7/22/2022    Nonrheumatic mitral valve regurgitation 7/22/2022    Other hyperlipidemia 7/22/2022    Primary hypertension 7/22/2022    Type 2 diabetes mellitus without complication, without long-term current use of insulin 7/22/2022    Type 2 diabetes mellitus, with long-term current use of insulin 7/22/2022       Past Surgical History:   Procedure Laterality Date    INGUINAL HERNIA REPAIR         Social History     Tobacco Use    Smoking status: Former    Smokeless tobacco: Never   Substance Use Topics    Alcohol use: Yes     Alcohol/week: 7.0 standard drinks     Types: 7 Cans of beer per week     Comment: at least twice weekly        Family History   Problem Relation Age of Onset    Hypertension Mother        Current Outpatient Medications   Medication Sig    amLODIPine (NORVASC) 5 MG tablet Take 5 mg by mouth once daily.    aspirin (ECOTRIN) 81 MG EC tablet Take 81 mg by mouth.    atorvastatin (LIPITOR) 10 MG tablet Take 10 mg by mouth once daily.    b complex vitamins capsule Take 1 capsule by mouth once daily.    buPROPion (WELLBUTRIN XL) 150 MG TB24 tablet Take 150 mg by mouth every evening.    doxazosin (CARDURA) 8 MG Tab Take 8 mg by mouth once daily.    ELIQUIS 5 mg Tab Take 5 mg by mouth 2 (two) times daily.    glipiZIDE (GLUCOTROL) 10 MG tablet Take 1 tablet by mouth 2 (two) times daily.    hydroCHLOROthiazide (HYDRODIURIL) 25 MG tablet Take 25 mg by mouth once daily.    insulin glargine 100 units/mL SubQ pen Inject 20 Units into the skin.    irbesartan (AVAPRO) 300 MG tablet Take 1  tablet by mouth every evening.    JARDIANCE 25 mg tablet Take 25 mg by mouth once daily.    meloxicam (MOBIC) 7.5 MG tablet Take 1 tablet by mouth once daily.    metFORMIN (GLUCOPHAGE) 1000 MG tablet Take 1,000 mg by mouth 2 (two) times daily.    methocarbamoL (ROBAXIN) 500 MG Tab Take 1 tablet by mouth daily as needed.    metoprolol succinate (TOPROL-XL) 50 MG 24 hr tablet Take 1 tablet (50 mg total) by mouth once daily.    pioglitazone (ACTOS) 15 MG tablet Take 15 mg by mouth once daily.    spironolactone (ALDACTONE) 25 MG tablet Take 25 mg by mouth once daily.     No current facility-administered medications for this visit.     Current Outpatient Medications on File Prior to Visit   Medication Sig    amLODIPine (NORVASC) 5 MG tablet Take 5 mg by mouth once daily.    aspirin (ECOTRIN) 81 MG EC tablet Take 81 mg by mouth.    atorvastatin (LIPITOR) 10 MG tablet Take 10 mg by mouth once daily.    b complex vitamins capsule Take 1 capsule by mouth once daily.    buPROPion (WELLBUTRIN XL) 150 MG TB24 tablet Take 150 mg by mouth every evening.    doxazosin (CARDURA) 8 MG Tab Take 8 mg by mouth once daily.    ELIQUIS 5 mg Tab Take 5 mg by mouth 2 (two) times daily.    glipiZIDE (GLUCOTROL) 10 MG tablet Take 1 tablet by mouth 2 (two) times daily.    hydroCHLOROthiazide (HYDRODIURIL) 25 MG tablet Take 25 mg by mouth once daily.    insulin glargine 100 units/mL SubQ pen Inject 20 Units into the skin.    irbesartan (AVAPRO) 300 MG tablet Take 1 tablet by mouth every evening.    JARDIANCE 25 mg tablet Take 25 mg by mouth once daily.    meloxicam (MOBIC) 7.5 MG tablet Take 1 tablet by mouth once daily.    metFORMIN (GLUCOPHAGE) 1000 MG tablet Take 1,000 mg by mouth 2 (two) times daily.    methocarbamoL (ROBAXIN) 500 MG Tab Take 1 tablet by mouth daily as needed.    metoprolol succinate (TOPROL-XL) 50 MG 24 hr tablet Take 1 tablet (50 mg total) by mouth once daily.    pioglitazone (ACTOS) 15 MG tablet Take 15 mg by mouth once  daily.    spironolactone (ALDACTONE) 25 MG tablet Take 25 mg by mouth once daily.     No current facility-administered medications on file prior to visit.     Review of patient's allergies indicates:   Allergen Reactions    Ace inhibitors       Review of Systems   Constitutional: Negative for malaise/fatigue.   Eyes:  Negative for blurred vision.   Cardiovascular:  Negative for chest pain, claudication, cyanosis, dyspnea on exertion, irregular heartbeat, leg swelling, near-syncope, orthopnea, palpitations and paroxysmal nocturnal dyspnea.   Respiratory:  Negative for cough, hemoptysis and shortness of breath.    Hematologic/Lymphatic: Negative for bleeding problem. Does not bruise/bleed easily.   Skin:  Negative for dry skin and itching.   Musculoskeletal:  Negative for falls, muscle weakness and myalgias.   Gastrointestinal:  Negative for abdominal pain, diarrhea, heartburn, hematemesis, hematochezia and melena.   Genitourinary:  Negative for flank pain and hematuria.   Neurological:  Negative for dizziness, focal weakness, headaches, light-headedness, numbness, paresthesias, seizures and weakness.   Psychiatric/Behavioral:  Negative for altered mental status and memory loss. The patient is not nervous/anxious.    Allergic/Immunologic: Negative for hives.     Objective:   Physical Exam  Vitals and nursing note reviewed.   Constitutional:       General: He is not in acute distress.     Appearance: He is well-developed. He is not diaphoretic.   HENT:      Head: Normocephalic and atraumatic.   Eyes:      General:         Right eye: No discharge.         Left eye: No discharge.      Pupils: Pupils are equal, round, and reactive to light.   Neck:      Thyroid: No thyromegaly.      Vascular: No JVD.   Cardiovascular:      Rate and Rhythm: Normal rate and regular rhythm.      Pulses: Intact distal pulses.           Carotid pulses are 2+ on the right side and 2+ on the left side.       Radial pulses are 2+ on the right  "side and 2+ on the left side.        Femoral pulses are 2+ on the right side and 2+ on the left side.       Popliteal pulses are 2+ on the right side and 2+ on the left side.        Dorsalis pedis pulses are 2+ on the right side and 2+ on the left side.        Posterior tibial pulses are 2+ on the right side and 2+ on the left side.      Heart sounds: Murmur heard.   High-pitched blowing holosystolic murmur is present with a grade of 2/6 at the apex.     No friction rub. No gallop.   Pulmonary:      Effort: Pulmonary effort is normal. No respiratory distress.      Breath sounds: Normal breath sounds. No wheezing or rales.   Chest:      Chest wall: No tenderness.   Abdominal:      General: Bowel sounds are normal. There is no distension.      Palpations: Abdomen is soft.      Tenderness: There is no abdominal tenderness.   Musculoskeletal:         General: Normal range of motion.      Cervical back: Neck supple.   Skin:     General: Skin is warm and dry.      Findings: No erythema or rash.   Neurological:      Mental Status: He is alert and oriented to person, place, and time.      Cranial Nerves: No cranial nerve deficit.   Psychiatric:         Behavior: Behavior normal.     Vitals:    11/02/22 1444 11/02/22 1445   BP: 134/62 136/66   BP Location: Right arm Left arm   Patient Position: Sitting Sitting   BP Method: Medium (Manual) Medium (Manual)   Pulse: 71    SpO2: 98%    Weight: 98.7 kg (217 lb 9.5 oz)    Height: 6' 2" (1.88 m)      No results found for: CHOL  No results found for: HDL  No results found for: LDLCALC  No results found for: TRIG  No results found for: CHOLHDL    Chemistry    No results found for: NA, K, CL, CO2, BUN, CREATININE, GLU No results found for: CALCIUM, ALKPHOS, AST, ALT, BILITOT, ESTGFRAFRICA, EGFRNONAA       No results found for: TSH  No results found for: INR, PROTIME  No results found for: WBC, HGB, HCT, MCV, PLT  BMP  No results found for: NA, K, CL, CO2, BUN, CREATININE, CALCIUM, " ANIONGAP, ESTGFRAFRICA, EGFRNONAA  CrCl cannot be calculated (No successful lab value found.).  Conclusion       Predominant Rhythm Sinus rhythm with heart rates varying between 47 and 117 BPM with an average of 64 BPM.     Monitoring started at 8:46 AM and continued for 47 hr 59 min. The average heart rate was 64 BPM. The minimum heart  rate was 47 BPM, occurring at 12:15:00 AM D1. The maximum heart rate was 117 BPM, occurring at 7:49:04 AM D1.     Ventricular ectopic activity consisted of 1568 beats, of which, 25 were in 5 runs, 12 were in triplets, 98 were in couplets,  1062 were in single PVCs, 237 were single VEs, 1 was in late, 26 were in bigeminy, 107 were in trigeminy. The longest ventricular  run occurred at 11:08:10 PM D1, consisting of 9 beats, with maximum heart rate of 66 BPM. The fastest ventricular run occurred at  8:42:34 AM D1, consisting of 4 beats, with maximum heart rate of 125 BPM.     The patient's rhythm included 22 hr 13 min 23 sec of bradycardia. The slowest single episode of bradycardia occurred at  5:16:27 AM D2, lasting 38 sec, with minimum heart rate of 47 BPM.     The patient's rhythm included 25 min 33 sec of tachycardia. The fastest single episode of tachycardia occurred at 7:49:01  AM D1, lasting 36 sec, with maximum heart rate of 117 BPM.     Supraventricular ectopic activity consisted of 208 beats, of which, 18 were in atrial couplets, 6 were late beats, 184 were  single PACs. The longest R-R interval was 1.8 seconds occurring at 12:20:12 AM D1. The longest N-N interval was 1.6 seconds  occurring at 5:53:12 PM D2.      Summary    The left ventricle is normal in size with eccentric hypertrophy and mildly decreased systolic function.  Severe left atrial enlargement.  The estimated ejection fraction is 45%.  Grade II left ventricular diastolic dysfunction.  There are segmental left ventricular wall motion abnormalities.  Normal right ventricular size with normal right ventricular  systolic function.  Mild right atrial enlargement.  Moderate mitral regurgitation.  Moderate tricuspid regurgitation.  Mild pulmonic regurgitation.  Intermediate central venous pressure (8 mmHg).  The estimated PA systolic pressure is 40 mmHg.       1. Nonrheumatic mitral valve regurgitation    2. Chronic diastolic congestive heart failure    3. Other hyperlipidemia    4. Paroxysmal atrial flutter    5. Primary hypertension    6. Type 2 diabetes mellitus without complication, with long-term current use of insulin      He has some fatigue ? Related to b blockers. The issue with him is the need to have any mitral valve intervention. I think he is on appropriate therapy w/o any syncope chf angina. His exercise tolerance ? Seems stable. I think if we prove that he has a decrease in ef with exercise or lv dilatation. Or progressive enlargement of lv cavity on f./u stress echo then an intervention is needed.    He was counseled again about the importance of continued compliance with salt intake etoh in order to avoid any Decompensation.   Reviewed DR JUSTIN HENSON.  Plan:   Continue current therapy  Cardiac low salt diet.  Risk factor modification and excercise program.  F/u in 6 months    Will review echo scheduled at [facility].

## 2023-05-01 DIAGNOSIS — I34.0 NONRHEUMATIC MITRAL VALVE REGURGITATION: Primary | ICD-10-CM
